# Patient Record
Sex: MALE | Race: WHITE | NOT HISPANIC OR LATINO | URBAN - METROPOLITAN AREA
[De-identification: names, ages, dates, MRNs, and addresses within clinical notes are randomized per-mention and may not be internally consistent; named-entity substitution may affect disease eponyms.]

---

## 2017-02-22 ENCOUNTER — OFFICE (OUTPATIENT)
Dept: URBAN - METROPOLITAN AREA CLINIC 78 | Facility: CLINIC | Age: 42
End: 2017-02-22

## 2017-02-22 PROCEDURE — 00001: CPT

## 2022-12-22 ENCOUNTER — OFFICE (OUTPATIENT)
Dept: URBAN - METROPOLITAN AREA CLINIC 79 | Facility: CLINIC | Age: 47
End: 2022-12-22
Payer: MEDICAID

## 2022-12-22 VITALS
TEMPERATURE: 98.6 F | SYSTOLIC BLOOD PRESSURE: 144 MMHG | WEIGHT: 195 LBS | HEART RATE: 96 BPM | DIASTOLIC BLOOD PRESSURE: 92 MMHG | HEIGHT: 69 IN

## 2022-12-22 DIAGNOSIS — J02.9 ACUTE PHARYNGITIS, UNSPECIFIED: ICD-10-CM

## 2022-12-22 DIAGNOSIS — Z12.11 ENCOUNTER FOR SCREENING FOR MALIGNANT NEOPLASM OF COLON: ICD-10-CM

## 2022-12-22 DIAGNOSIS — R07.89 OTHER CHEST PAIN: ICD-10-CM

## 2022-12-22 DIAGNOSIS — K44.9 DIAPHRAGMATIC HERNIA WITHOUT OBSTRUCTION OR GANGRENE: ICD-10-CM

## 2022-12-22 DIAGNOSIS — K21.9 GASTRO-ESOPHAGEAL REFLUX DISEASE WITHOUT ESOPHAGITIS: ICD-10-CM

## 2022-12-22 PROCEDURE — 99204 OFFICE O/P NEW MOD 45 MIN: CPT | Performed by: PHYSICIAN ASSISTANT

## 2024-05-16 ENCOUNTER — TELEHEALTH PROVIDED OTHER THAN IN PATIENT'S HOME (OUTPATIENT)
Dept: URBAN - METROPOLITAN AREA TELEHEALTH 3 | Facility: TELEHEALTH | Age: 49
End: 2024-05-16
Payer: MEDICAID

## 2024-05-16 VITALS — HEIGHT: 70 IN | WEIGHT: 180 LBS

## 2024-05-16 DIAGNOSIS — R13.10 DYSPHAGIA, UNSPECIFIED: ICD-10-CM

## 2024-05-16 DIAGNOSIS — Z12.11 ENCOUNTER FOR SCREENING FOR MALIGNANT NEOPLASM OF COLON: ICD-10-CM

## 2024-05-16 DIAGNOSIS — K90.0 CELIAC DISEASE: ICD-10-CM

## 2024-05-16 DIAGNOSIS — E66.3 OVERWEIGHT: ICD-10-CM

## 2024-05-16 DIAGNOSIS — R10.13 EPIGASTRIC PAIN: ICD-10-CM

## 2024-05-16 DIAGNOSIS — R14.0 ABDOMINAL DISTENSION (GASEOUS): ICD-10-CM

## 2024-05-16 DIAGNOSIS — R68.81 EARLY SATIETY: ICD-10-CM

## 2024-05-16 PROCEDURE — 99214 OFFICE O/P EST MOD 30 MIN: CPT | Mod: 95 | Performed by: PHYSICIAN ASSISTANT

## 2024-05-16 NOTE — SERVICEHPINOTES
PATIENT VERIFIED BY DATE OF BIRTH AND NAME. Patient has been consented for this telecommunication visit using [employer] application.   Pt's dad and his paternal uncle had abdominal aortic aneurysms--pt wants to be checked for this--his PCP was going to order an U/S--I have encouraged pt to make sure that he gets this order. Pt states that his son has celiac disease.    Pt has been gluten free for > 12 yrs. Pt states that he likely has celiac disease as he believes he had a + celiac panel in the past. Now if he eats any gluten at all he gets very sick so completely avoids it. He has a lot of abdominal bloating with eating spicy foods. He has epigastric pain too--this is intermittent but worse with spicy foods--this symptom is very common. Pt has been on Pantoprazole daily x 6 months but still has epigastric pain. No vomiting but no nausea. Can get dysphagia to certain solid foods ie. cheese. Has lost wt unintentionally--went from 205 down to 180 in the past one year. Notes early satiety. Does see black stool at times--very occasional--no pepto or iron use. 
br
br
No prior colonoscopy. No constipation or diarrhea. No lower ab pain. No family hx of CRC. 
br
brNo current issues with asthma--it was a past issue (in high school). No known heart issues. His pituitary tumor has shrunk and doesn't require any medication--no current issues from that. 
br
br
No other GI related complaints today. ROS as per HPI and o/w is unremarkable.

## 2024-05-16 NOTE — SERVICENOTES
Patient was located in their home during visit., I spent 20 minutes today reviewing the chart, talking with the patient, reviewing previous results with patient, discussing plan, and documenting. Patient understands and agrees with plan. Questions/concerns addressed., Patient's visit was conducted through [employer] video telecommunication. Patient consented before the start of visit as to understanding of privacy concerns, possible technological failure, and their responsibility of carrying out instructions of plan.